# Patient Record
Sex: MALE | URBAN - METROPOLITAN AREA
[De-identification: names, ages, dates, MRNs, and addresses within clinical notes are randomized per-mention and may not be internally consistent; named-entity substitution may affect disease eponyms.]

---

## 2017-02-10 ENCOUNTER — TELEPHONE (OUTPATIENT)
Dept: NURSING | Facility: CLINIC | Age: 22
End: 2017-02-10

## 2017-02-11 NOTE — TELEPHONE ENCOUNTER
"Call Type: Triage Call    Presenting Problem: \"He can't go to our doctor because we're in a  hotel ([place]) but the cast on his arm got wet and I think it  needs to be replaced\". Advised see a provider within 24 hours. Note:  No records on this pt. for any visits.  Triage Note:  Guideline Title: Cast Symptoms And Questions (Pediatric)  Recommended Disposition: See Provider within 24 hours  Original Inclination: Wanted to speak with a nurse  Override Disposition:  Intended Action: Follow advice given  Physician Contacted: No  [1] Plaster cast gets wet AND [2] soft after attempted drying ?  YES  Difficulty breathing ? NO  Chest pain ? NO  Splint or elastic wrap concerns ? NO  SEVERE pain of fingers or toes ? NO  Blueness or pallor of fingers or toes (compared to noninjured side) ? NO  [1] Numbness (loss of sensation) of fingers or toes AND [2] persists after 1 hour  of elevation ? NO  [1] Tingling (pins and needles sensation) of fingers or toes AND [2] persists  after 1 hour of elevation ? NO  [1] Fingers or toes become swollen (compared to noninjured side) AND [2] no  improvement with elevation ? NO  Foreign body gets stuck under the cast ? NO  Drainage comes through cast or out of end of cast ? NO  Bad odor comes from underneath the cast ? NO  Unexplained fever occurs ? NO  Cast feels too tight ? NO  [1] Skin becomes red and raw at edge of cast AND [2] present > 24 hours ? NO  Cast breaks, cracks or falls off ? NO  [1] Increasing pain under cast AND [2] cast put on > 72 hours ago ? NO  Symptoms or questions after major surgery ? NO  [1] SEVERE pain (excruciating) under the cast AND [2] not improved after pain  medicine and elevation ? NO  Can't wiggle fingers or toes (older children) ? NO  [1] MODERATE pain of fingers or toes AND [2] not improved after pain medicine and  elevation ? NO  Caller has URGENT question and triager unable to answer question ? NO  Physician Instructions:  Care Advice: CARE ADVICE given " per Cast Symptoms and Questions (Pediatric)  guideline.  CALL BACK IF: * Finger or toes develop numbness, tingling or pain * Can't  wiggle fingers or toes (older children) * Fingers or toes become bluish or  pale * Your child becomes worse  SEE PHYSICIAN WITHIN 24 HOURS: * IF OFFICE WILL BE OPEN: Your child needs  to be examined within the next 24 hours. Call your child's doctor when the  office opens, and make an appointment. * IF OFFICE WILL BE CLOSED: Your  child needs to be examined within the next 24 hours. An Urgent Care Center  is often a good source of care if your doctor's office closed. Go to  _________ . * IF PATIENT HAS NO PCP: Refer patient to an Urgent Care Center  or Retail clinic. Also try to help caller find a PCP (medical home) for  their child.  WET PLASTER CAST - HOW TO DRY: * A small wet spot or wet lining can be  blow-dried with a hair dryer. Use a low setting. Caution: Hot air can cause  burns. * When larger spots become wet, they usually turn soft or even  dissolve. Even if dried, they usually stay soft and need to be replaced.